# Patient Record
Sex: MALE | Race: WHITE | URBAN - NONMETROPOLITAN AREA
[De-identification: names, ages, dates, MRNs, and addresses within clinical notes are randomized per-mention and may not be internally consistent; named-entity substitution may affect disease eponyms.]

---

## 2021-08-20 ENCOUNTER — IMPORTED ENCOUNTER (OUTPATIENT)
Dept: URBAN - NONMETROPOLITAN AREA CLINIC 1 | Facility: CLINIC | Age: 46
End: 2021-08-20

## 2021-08-20 PROBLEM — H52.4: Noted: 2021-08-20

## 2021-08-20 PROBLEM — H40.013: Noted: 2021-08-20

## 2021-08-20 PROBLEM — H25.013: Noted: 2021-08-20

## 2021-08-20 PROCEDURE — 92004 COMPRE OPH EXAM NEW PT 1/>: CPT

## 2021-08-20 PROCEDURE — 92015 DETERMINE REFRACTIVE STATE: CPT

## 2021-08-20 NOTE — PATIENT DISCUSSION
Myopia / Astigmatism / Presbyopia OU - Discussed diagnosis in detail with patient- New Glasses RX given today- Discussed LASIK in detail with patient patient may set up appointment with Dr. Sushila Yu when ready  - Continue to monitor- RTC 1 year complete Cataracts OU- Discussed diagnosis in detail with patient- Discussed signs and symptoms of progression- Discussed UV protection- No treatment needed at this time - Continue to monitorBorderline Glaucoma OU- Discussed diagnosis in detail with patient- IOP today OD 12 and OS 14- Cup to Disc noted at [address] [place] . 35 and OS . 55- Continue to monitor- RTC 3-4 months F/U BL GL With VF OCT and PACHY

## 2022-04-09 ASSESSMENT — TONOMETRY
OS_IOP_MMHG: 14
OD_IOP_MMHG: 12

## 2022-04-09 ASSESSMENT — VISUAL ACUITY
OS_SC: 20/20
OD_SC: 20/22